# Patient Record
Sex: MALE | URBAN - METROPOLITAN AREA
[De-identification: names, ages, dates, MRNs, and addresses within clinical notes are randomized per-mention and may not be internally consistent; named-entity substitution may affect disease eponyms.]

---

## 2024-11-19 ENCOUNTER — TELEPHONE (OUTPATIENT)
Dept: OPHTHALMOLOGY | Facility: MEDICAL CENTER | Age: 39
End: 2024-11-19

## 2024-11-20 NOTE — TELEPHONE ENCOUNTER
Spoke with patient in regards to seeing him in [facility]. We are not contracted with Workers comp. We are willing to see him under his medical ins and or cash pay.   Patient stated he was going to call me back tomorrow after he talked to his .

## 2024-12-10 ENCOUNTER — TELEPHONE (OUTPATIENT)
Dept: OPHTHALMOLOGY | Facility: MEDICAL CENTER | Age: 39
End: 2024-12-10